# Patient Record
(demographics unavailable — no encounter records)

---

## 2025-06-27 NOTE — HISTORY OF PRESENT ILLNESS
[Student] : Work status: student [de-identified] : 6/27/25: 6 y/o female here today with her parents complaining of RT wrist pain that started yesterday after falling.   RT hand dominant Allergies: none Prior treatment: none PMH: denies pertinent PMH  [] : no

## 2025-06-27 NOTE — ASSESSMENT
[FreeTextEntry1] : The patient was advised of the diagnosis. The natural history of the pathology was explained in full to the patient in layman's terms. All questions were answered. The risks and benefits of surgical and non-surgical treatment alternatives were explained in full to the patient.   Pt provided right EXOS brace / splint x 3 weeks. RTO in 1 week for f/u care with upper extremity specialist. OTC motrin for discomfort.  NSAIDs recommended.  Patient warned of risk of NSAID medication to stomach and GI tract, risk of increase blood pressure, cardiac risk, and risk of fluid retention.  The patient should clear taking medication with internist/PMD if any problem with heart, blood pressure, or GI system exists.

## 2025-06-27 NOTE — IMAGING
[de-identified] : Right wrist / hand exam shows: minimal swelling TTP: distal radius ROM: limited Wrist strength: 4/5 all planes  and intrinsic strength: 5/5 examination abbreviatd due to guarding and underlying fx. All digits are NVI and with FAROM (intact flexor and extensor tendon function).  Right wrist 2 view x-ray showed: distal radius buckle fracture.

## 2025-07-07 NOTE — ASSESSMENT
[FreeTextEntry1] : I independently reviewed and interpreted outside @6/27/25 XRAYS OF RIGHT WRIST (3 views - PA, OBLIQUE, AND LATERAL VIEWS): non-displaced distal radius buckle fx. open physes.  The condition was explained to the patient and her parents. Fracture alignment within acceptable limits. Plan to proceed with non-operative treatment.   Risks of treatment include, but are not limited to persistent pain, stiffness, fracture displacement, mal-union, non-union. - continue EXOS wrist brace, full time except hygiene. - encouraged HEP (making a fist) to reduce finger stiffness. - elevate hand above level of heart as much as possible to reduce swelling. - NWB to R hand. no gym/sports. anticipate at least 6 weeks for return to gymnastics.   F/u 2-3 weeks. X-rays of R wrist out of EXOS brace.

## 2025-07-07 NOTE — IMAGING
[de-identified] : RIGHT HAND skin intact.  TTP to distal radius.  good EPL, FPL. good finger extension, flex to full fist. good finger abduction and adduction.  SILT to median, ulnar, radial distribution.  brisk cap refill all digits. no triggering.  @6/27/25 XRAYS OF RIGHT WRIST (3 views - PA, OBLIQUE, AND LATERAL VIEWS): non-displaced distal radius buckle fx. open physes.

## 2025-07-07 NOTE — HISTORY OF PRESENT ILLNESS
[de-identified] : 7/7/25: HPI obtained from patient's parent as independent historian due to patient's age making them an unreliable historian.  6yo female (RHD) presents with parents for RIGHT wrist pain after a fall on 6/26/25. Saw ANDRY Ellis on 6/27/25 => XR showed fx, EXOS wrist brace. [FreeTextEntry5] : YINKA RANDLESANIYA potter [RHD] 5 year old female is here today for evaluation of RIGHT wrist after falling on 06/26/25. saw ANDRY Ellis on 06/27/25 +xrays and EXOS brace. no h/o prior injury.

## 2025-07-24 NOTE — IMAGING
[de-identified] : RIGHT HAND skin intact.  mild TTP to distal radius.  wrist ROM: good extension, flexion. good EPL, FPL. good finger extension, flex to full fist. good finger abduction and adduction.  SILT to median, ulnar, radial distribution.  brisk cap refill all digits. no triggering.  @7/24/25 XRAYS OF RIGHT WRIST (3 views - PA, OBLIQUE, AND LATERAL VIEWS): stable position/alignment with interval healing of non-displaced distal radius buckle fx. open physes.

## 2025-07-24 NOTE — ASSESSMENT
[FreeTextEntry1] : - d/c EXOS wrist brace. - advance activity as pain allows. ok gym/sports. ok for gymnastics conditioning, advance weight bearing as pain allows.  F/u PRN.

## 2025-07-24 NOTE — HISTORY OF PRESENT ILLNESS
[de-identified] : 7/24/25: presents with mother 4 weeks s/p RIGHT distal radius buckle fx. in EXOS wrist brace full time.  7/7/25: HPI obtained from patient's parent as independent historian due to patient's age making them an unreliable historian.  4yo female (RHD) presents with parents for RIGHT wrist pain after a fall on 6/26/25. Saw ANDRY Ellis on 6/27/25 => XR showed fx, EXOS wrist brace. [FreeTextEntry5] : YINKA is here today to follow up on her RIGHT wrist.